# Patient Record
Sex: MALE | NOT HISPANIC OR LATINO | URBAN - METROPOLITAN AREA
[De-identification: names, ages, dates, MRNs, and addresses within clinical notes are randomized per-mention and may not be internally consistent; named-entity substitution may affect disease eponyms.]

---

## 2017-01-27 ENCOUNTER — AMBULATORY - HEALTHEAST (OUTPATIENT)
Dept: PHYSICAL MEDICINE AND REHAB | Facility: CLINIC | Age: 82
End: 2017-01-27

## 2017-02-21 ENCOUNTER — HOSPITAL ENCOUNTER (OUTPATIENT)
Dept: PHYSICAL MEDICINE AND REHAB | Facility: CLINIC | Age: 82
Discharge: HOME | End: 2017-02-21
Attending: PHYSICAL MEDICINE & REHABILITATION

## 2017-02-21 DIAGNOSIS — M54.50 LUMBAR SPINE PAIN: ICD-10-CM

## 2017-02-21 DIAGNOSIS — M53.3 SACROILIAC JOINT PAIN: ICD-10-CM

## 2017-02-21 DIAGNOSIS — M47.812 CERVICAL SPONDYLOSIS: ICD-10-CM

## 2017-02-21 DIAGNOSIS — M48.061 STENOSIS OF LATERAL RECESS OF LUMBAR SPINE: ICD-10-CM

## 2017-02-21 DIAGNOSIS — G62.9 POLYNEUROPATHY: ICD-10-CM

## 2017-02-21 DIAGNOSIS — M47.816 LUMBAR SPONDYLOSIS: ICD-10-CM

## 2017-02-21 RX ORDER — TRIAMTERENE AND HYDROCHLOROTHIAZIDE 37.5; 25 MG/1; MG/1
CAPSULE ORAL
Status: SHIPPED | COMMUNITY
Start: 2017-01-10

## 2017-02-21 ASSESSMENT — MIFFLIN-ST. JEOR: SCORE: 1351.45

## 2018-01-22 ENCOUNTER — HOSPITAL ENCOUNTER (OUTPATIENT)
Dept: PHYSICAL MEDICINE AND REHAB | Facility: CLINIC | Age: 83
Discharge: HOME | End: 2018-01-22
Attending: PHYSICAL MEDICINE & REHABILITATION

## 2018-01-22 DIAGNOSIS — M54.50 LUMBAR SPINE PAIN: ICD-10-CM

## 2018-01-22 DIAGNOSIS — M47.816 LUMBAR SPONDYLOSIS: ICD-10-CM

## 2018-01-22 DIAGNOSIS — G62.9 POLYNEUROPATHY: ICD-10-CM

## 2018-01-22 DIAGNOSIS — M79.2 NEUROPATHIC PAIN, LEG, RIGHT: ICD-10-CM

## 2018-01-22 RX ORDER — GLIPIZIDE 5 MG/1
TABLET ORAL
Status: SHIPPED | COMMUNITY
Start: 2018-01-18

## 2018-01-22 RX ORDER — PANTOPRAZOLE SODIUM 40 MG/1
40 TABLET, DELAYED RELEASE ORAL
Status: SHIPPED | COMMUNITY
Start: 2017-07-25

## 2018-01-22 ASSESSMENT — MIFFLIN-ST. JEOR: SCORE: 1378.66

## 2018-02-12 ENCOUNTER — COMMUNICATION - HEALTHEAST (OUTPATIENT)
Dept: PHYSICAL MEDICINE AND REHAB | Facility: CLINIC | Age: 83
End: 2018-02-12

## 2018-02-12 DIAGNOSIS — M54.50 LUMBAR SPINE PAIN: ICD-10-CM

## 2018-02-12 DIAGNOSIS — M79.2 NEUROPATHIC PAIN, LEG, RIGHT: ICD-10-CM

## 2018-02-12 RX ORDER — PREGABALIN 25 MG/1
25 CAPSULE ORAL 2 TIMES DAILY
Qty: 60 CAPSULE | Refills: 2 | Status: SHIPPED | OUTPATIENT
Start: 2018-02-12

## 2018-08-27 ENCOUNTER — COMMUNICATION - HEALTHEAST (OUTPATIENT)
Dept: PHYSICAL MEDICINE AND REHAB | Facility: CLINIC | Age: 83
End: 2018-08-27

## 2018-09-10 ENCOUNTER — HOSPITAL ENCOUNTER (OUTPATIENT)
Dept: PHYSICAL MEDICINE AND REHAB | Facility: CLINIC | Age: 83
Discharge: HOME | End: 2018-09-10
Attending: PHYSICAL MEDICINE & REHABILITATION

## 2018-09-10 DIAGNOSIS — M54.81 BILATERAL OCCIPITAL NEURALGIA: ICD-10-CM

## 2018-09-10 DIAGNOSIS — M79.2 NEUROPATHIC PAIN, LEG, RIGHT: ICD-10-CM

## 2018-09-10 DIAGNOSIS — G62.9 POLYNEUROPATHY: ICD-10-CM

## 2018-09-10 DIAGNOSIS — M47.812 CERVICAL SPONDYLOSIS: ICD-10-CM

## 2018-09-10 DIAGNOSIS — M54.2 CERVICALGIA: ICD-10-CM

## 2018-09-10 ASSESSMENT — MIFFLIN-ST. JEOR: SCORE: 1374.13

## 2018-09-13 ENCOUNTER — COMMUNICATION - HEALTHEAST (OUTPATIENT)
Dept: PHYSICAL MEDICINE AND REHAB | Facility: CLINIC | Age: 83
End: 2018-09-13

## 2019-03-20 ENCOUNTER — COMMUNICATION - HEALTHEAST (OUTPATIENT)
Dept: PHYSICAL MEDICINE AND REHAB | Facility: CLINIC | Age: 84
End: 2019-03-20

## 2019-03-20 ENCOUNTER — HOSPITAL ENCOUNTER (OUTPATIENT)
Dept: PHYSICAL MEDICINE AND REHAB | Facility: CLINIC | Age: 84
Discharge: HOME | End: 2019-03-20
Attending: PHYSICAL MEDICINE & REHABILITATION

## 2019-03-20 DIAGNOSIS — M79.2 NEUROPATHIC PAIN, LEG, RIGHT: ICD-10-CM

## 2019-03-20 DIAGNOSIS — G47.9 SLEEP DIFFICULTIES: ICD-10-CM

## 2019-03-20 DIAGNOSIS — M54.16 LUMBAR RADICULAR PAIN: ICD-10-CM

## 2019-03-20 DIAGNOSIS — M48.061 STENOSIS OF LATERAL RECESS OF LUMBAR SPINE: ICD-10-CM

## 2019-03-20 RX ORDER — DULOXETIN HYDROCHLORIDE 20 MG/1
20 CAPSULE, DELAYED RELEASE ORAL DAILY
Qty: 30 CAPSULE | Refills: 1 | Status: SHIPPED | OUTPATIENT
Start: 2019-03-20

## 2019-03-20 ASSESSMENT — MIFFLIN-ST. JEOR: SCORE: 1374.13

## 2019-03-28 ENCOUNTER — RECORDS - HEALTHEAST (OUTPATIENT)
Dept: ADMINISTRATIVE | Facility: OTHER | Age: 84
End: 2019-03-28

## 2019-04-12 ENCOUNTER — COMMUNICATION - HEALTHEAST (OUTPATIENT)
Dept: PHYSICAL MEDICINE AND REHAB | Facility: CLINIC | Age: 84
End: 2019-04-12

## 2019-04-12 DIAGNOSIS — M54.16 LUMBAR RADICULAR PAIN: ICD-10-CM

## 2020-03-03 ENCOUNTER — RECORDS - HEALTHEAST (OUTPATIENT)
Dept: ADMINISTRATIVE | Facility: OTHER | Age: 85
End: 2020-03-03

## 2020-03-09 ENCOUNTER — HOSPITAL ENCOUNTER (OUTPATIENT)
Dept: ULTRASOUND IMAGING | Facility: HOSPITAL | Age: 85
Discharge: HOME | End: 2020-03-09
Attending: INTERNAL MEDICINE

## 2020-03-09 DIAGNOSIS — N18.30 CHRONIC KIDNEY DISEASE, STAGE III (MODERATE) (H): ICD-10-CM

## 2021-05-24 ENCOUNTER — RECORDS - HEALTHEAST (OUTPATIENT)
Dept: ADMINISTRATIVE | Facility: CLINIC | Age: 86
End: 2021-05-24

## 2021-05-27 NOTE — TELEPHONE ENCOUNTER
If he still having pain despite physical therapy and has a pain flare despite doing his home exercises, I can place an order for a right S1-S2 transforaminal epidural steroid injection.  We can attempt to get this approved so this can be scheduled during time of a pain flare.    He should be educated that this plan is somewhat atypical for patient's as most of them do not have the severe flares intermittently and during a flare we will do our best to get him in and make this plan work.

## 2021-05-27 NOTE — TELEPHONE ENCOUNTER
Patient called to let Dr. Flowers know that he finished physical therapy.  He said he would like a injection order so that when he gets a flare up he can just schedule and come in to get his injection.  I explained to patient that we have to schedule him out 5-7 days per insurance purpose. Patient said that is not what he was told by Dr. Flowers.  He said his flare ups only last 30-40 hours so 5 days later for an injection isn't going to do him any good. He asked that I discuss this with Dr. Flowers because this is not what he was told.  He would like a call back once this is done.

## 2021-05-27 NOTE — TELEPHONE ENCOUNTER
Called patient.  Per Tia, Medicare does not need PA for injection.  He may call and get schedule possibly same day for his injection.  No need to see .

## 2021-05-29 ENCOUNTER — RECORDS - HEALTHEAST (OUTPATIENT)
Dept: ADMINISTRATIVE | Facility: CLINIC | Age: 86
End: 2021-05-29

## 2021-05-30 ENCOUNTER — RECORDS - HEALTHEAST (OUTPATIENT)
Dept: ADMINISTRATIVE | Facility: CLINIC | Age: 86
End: 2021-05-30

## 2021-05-30 VITALS — BODY MASS INDEX: 25.11 KG/M2 | HEIGHT: 67 IN | WEIGHT: 160 LBS

## 2021-05-31 VITALS — BODY MASS INDEX: 26.06 KG/M2 | HEIGHT: 67 IN | WEIGHT: 166 LBS

## 2021-06-01 VITALS — HEIGHT: 67 IN | WEIGHT: 165 LBS | BODY MASS INDEX: 25.9 KG/M2

## 2021-06-02 VITALS — WEIGHT: 165 LBS | HEIGHT: 67 IN | BODY MASS INDEX: 25.9 KG/M2

## 2021-06-09 NOTE — PROGRESS NOTES
Assessment/Plan:      Diagnoses and all orders for this visit:    Lumbar spine pain    Sacroiliac joint pain    Lumbar spondylosis    Stenosis of lateral recess of lumbar spine    Cervical spondylosis    Polyneuropathy      Assessment: Multiple chronic pain complaints:    1.  Chronic lumbar spine pain at the lumbosacral junction.  Most consistent with SI mediated pain right greater than left versus facet mediated pain    2.  Right leg paresthesias with long bike rides.  He has Multilevel spondylosis with degenerative disc disease.  Status post L3 and L4 laminectomy.  Broad based disc bulge at L5-S1 narrowing the lateral recess on the right likely responsible for his right leg pain.    3. Cervical spondylosis at multiple levels on x-ray with asymmetric mild right greater than left AA joint osteoarthritis.  He also has changes on MRI consistent with spondylosis with multilevel degenerative disc disease in the mid cervical spine with some central stenosis.     4. Headaches/had pain intermittently likely cervicogenic in nature.    5.  Polyneuropathy findings on EMG.  Likely incidental and asymptomatic.      Discussion:    1.  I reviewed all of the issues with him again today.  He was somewhat confused and wanted to go through each issue and have an explanation for what is causing his symptoms.  We discussed this as well as treatment options.    2.  Continue physical therapy for cervical spine and lumbar spine.    3.  Offered SI joint injection but he is not interested in any intervention at this time as he is not as active at this time of year.    4.  In the future we could perform SI joint injection facet injections for the pain in the low back, transforaminal epidural for the right leg pain, and can also offer interventions for the head pain.  He is not interested at this time.    5.  Reassurance provided for the polyneuropathy.    6.  Follow up with me as needed    25 minutes were spent with this patient in addition  to any procedure with greater than 20 minutes in counseling and coordination of care.    It was our pleasure caring for your patient today, if there any questions or concerns please do not hesitate to contact us.      Subjective:   Patient ID: Kiel Gil is a 82 y.o. male.    History of Present Illness: Patient presents for evaluation of multiple pain complaints.  Most significant pain is lumbar spine.  Straight lumbosacral junction to the right greater than left SI region.  Pain is a 3/10 today 10/10 at worst.  No changes last visit.  Worse when he first gets up in the morning better with activity.  No current radiation down the legs numbness tingling weakness.    With episodes of biking he also gets some right leg numbness and tingling down to the foot.  Wondering what is causing this.  Of biking also causes significant suboccipital pain and head pain.  It's long times of sitting flex forward of looking up.  His leg pain that improves with any lumbar extension or flexing his lumbar spine with extending his right leg and hip.    Again, he has been doing physical therapy doing his exercises and is unsure that it is too terribly helpful but wanted to check in today to review each diagnosis again.  He also had questions about his EMG that was done at [facility].    EMG: Do show diffuse low amplitudes of the motor studies an absent sensory studies with some increased insertional activity in the EHL and medial gastroc.  Findings are consistent with the polyneuropathy finding cannot exclude L5 radiculopathy concomitantly.    Imaging: I reviewed MRI images of the lumbar spine.  Multilevel degenerative disc disease.  L5-S1 broad-based right paracentral disc bulge narrowing the right greater than left lateral recess.    Cervical plain films personally reviewed.  Degenerative disc disease throughout the mid cervical spine.  Right greater than left asymmetric degenerative changes of the AA joint.    Cervical  MRI images reviewed.  Central stenosis in the mid cervical spine from multilevel degenerative disc disease no high-grade spinal cord compression.    Review of systems:No numbness, tingling or weakness.  No bowel or bladder incontinence.  No headaches, dizziness, nausea, vomiting, blurred vision or balance deficits.    Past Medical History:   Diagnosis Date     Change in bowel habit      Diabetes mellitus      Gout      Heart disease      Hyperlipidemia      Hypertension      Irregular heart rate        The following portions of the patient's history were reviewed and updated as appropriate: allergies, current medications, past family history, past medical history, past social history, past surgical history and problem list.      Objective:   Physical Exam:    Vitals:    02/21/17 0805   Pulse: 77       General: Alert and oriented with normal affect. Attention, knowledge, memory, and language are intact. No acute distress.   Eyes: Sclerae are clear.  Respirations: Unlabored. CV: No lower extremity edema.   Gait:  Nonantalgic  Sensation is intact to light touch throughout the ower extremities.  Reflexes are   One plus right, 2+ left patellar and 0 Achilles  .    Manual muscle testing reveals:  Right /Left out of 5        5/5 ankle plantar flexors  5/5 ankle dorsiflexors  5/5  EHL

## 2021-06-15 NOTE — PROGRESS NOTES
Assessment/Plan:      Diagnoses and all orders for this visit:    Neuropathic pain, leg, right  -     Discontinue: pregabalin (LYRICA) 25 MG capsule; Take 1 capsule (25 mg total) by mouth 2 (two) times a day.  Dispense: 60 capsule; Refill: 2  -     pregabalin (LYRICA) 25 MG capsule; Take 1 capsule (25 mg total) by mouth 2 (two) times a day.  Dispense: 60 capsule; Refill: 2    Lumbar spine pain  -     Discontinue: pregabalin (LYRICA) 25 MG capsule; Take 1 capsule (25 mg total) by mouth 2 (two) times a day.  Dispense: 60 capsule; Refill: 2  -     pregabalin (LYRICA) 25 MG capsule; Take 1 capsule (25 mg total) by mouth 2 (two) times a day.  Dispense: 60 capsule; Refill: 2    Lumbar spondylosis    Polyneuropathy        Assessment: Very pleasant 83-year-old gentleman with a history of diabetes mellitus, hypertension hyperlipidemia with:    1. Worsening of right lower extremity paresthesias/neuropathic pain.  This starts in the right gluteal region down the back of the leg to the right lateral foot into the bottom of the foot.  Could be lumbar radicular nature or more of a peripheral neuropathic pain issue.  2.  Chronic lumbar spine pain at the lumbosacral junction.  He does have multilevel degenerative disc disease and facet arthropathy.  There is some mild to moderate right and mild left foraminal stenosis related to degenerative changes.  History of lumbar decompression L3-5.    3.  Polyneuropathy findings and old EMG from neurology.  Unknown significance clinically.  As he has no symptoms on his left leg.      Discussion:    1.  He has progressive worsening of the electrical sensations in the right leg which are consistent with neuropathic pain.  He does have some foraminal stenosis at L5-S1, but I am unsure of the correlation as his symptoms are increasing in frequency and intensity and duration.  This may represent a peripheral nerve issue as well.  Less likely vascular given normal pulses and his symptom  description.  We discussed options such as medications along with further imaging or diagnostics.  He is really interested in pain management and potentially solution for his pain if if a problem is found.  2.  Trial Lyrica 25 mg twice daily and can increase this to 50 mg twice daily if needed for his pain.  He is going to take this initially when the pain occurs to see if it is helpful.  He does not want to be on medications for maintenance of possible.  3.  He has some hydrocodone left over at home from a prior prescription.  This is helpful.  If his pain is severe enough he may need to take 1 of these and we can discuss potential pain management down the road but I really would like to find a cause for his pain initially.  4.  Consideration for updated MRI or EMG if this is not helpful.  5.  Follow-up 1 month    25 minutes were spent with this patient in addition to any procedure with greater than 50% in counseling and coordination of care.    It was our pleasure caring for your patient today, if there any questions or concerns please do not hesitate to contact us.      Subjective:   Patient ID: Kiel Gil is a 83 y.o. male.    History of Present Illness:Patient presents for an evaluation of right leg pain and paresthesias.  This pain is worsening over time and becomes quite severe.  Is a long-standing history of back issues dating back to the 1970s where he was hospitalized whenever he stood on the right leg had severe sharp pain down the right leg into the lateral foot and his entire leg became numb for several hours.  He did avoid surgery at that time in the lumbar spine and most of his symptoms have resolved.  He then had lumbar decompression around 2000 by Dr. Garcia at L3-L5.  Despite this every so often he has severe sharp shooting electrical pains from the right-sided low back down the back of the leg to the lateral ankle lateral foot into all the toes.  This used to only occur every several months  may be 2-3 times a year.  This is becoming more frequent now.  These come every several weeks and he will have several hours of these sensations occurring every 20 seconds.  Electrical sensations which are severe pain and he has not had anything to help.  He has tried gabapentin which was not helpful.  Has taken hydrocodone which does ease the pain.  Then the sensations can leave for a couple of weeks at a time.  No associated weakness in the leg no current numbness or tingling.    He has been seen by endocrinology.  He tells me they do not believe this is related to his diabetes..  He carries a diagnosis of polyneuropathy from neurology but does not have any symptoms in the left leg currently.  Has had an EMG over a year ago by [facility] which did show than polyneuropathy and no other lumbar radiculopathy.  Pain is a 4/10 at worst no pain today.  Unsure what aggravates his symptoms        Imaging: MRI report and images were personally reviewed and discussed with the patient.  A plastic model was utilized during the discussion.  MRI of the lumbar spine reveals multilevel degenerative disc disease.  Previous L3-L5 decompression posteriorly.  L5-S1 right greater than left lateral recess stenosis with residual broad-based right paracentral disc bulge mild to moderate right and mild left foraminal stenosis.  L4-5 tiny new left subarticular disc extrusion with effacement of the left L5 nerve.  This MRI was done in November 2016.    Review of Systems: Complains of some blurred vision.  No numbness, tingling or weakness.  No bowel or bladder incontinence.  No headaches, dizziness, nausea, vomiting,   or balance deficits.    Past Medical History:   Diagnosis Date     Change in bowel habit      Diabetes mellitus      Gout      Heart disease      Hyperlipidemia      Hypertension      Irregular heart rate      Social history: From [place] originally.  [facility]    The following portions of  the patient's history were reviewed and updated as appropriate: allergies, current medications, past family history, past medical history, past social history, past surgical history and problem list.      Objective:   Physical Exam:    Vitals:    01/22/18 1349   BP: 168/60       General: Alert and oriented with normal affect. Attention, knowledge, memory, and language are intact. No acute distress.   Eyes: Sclerae are clear.  Respirations: Unlabored. CV: No lower extremity edema.   Gait:  Nonantalgic  No tenderness palpation of the lumbar spine gluteal tissues are SI joint  Sensation is intact to light touch throughout the  lower extremities.  Reflexes are negative Hoffmans. 2+ patellar and 0Achilles with downgoing toes.    Manual muscle testing reveals:  Right /Left out of 5     5/5 hip flexors  5/5 knee flexors  5/5 knee extensors  5/5 ankle plantar flexors  5/5 ankle dorsiflexors  5/5  EHL

## 2021-06-20 ENCOUNTER — HEALTH MAINTENANCE LETTER (OUTPATIENT)
Age: 86
End: 2021-06-20

## 2021-06-20 NOTE — PROGRESS NOTES
Assessment/Plan:      Diagnoses and all orders for this visit:    Cervicalgia  -     CT Cervical Spine Without Contrast; Future; Expected date: 9/10/18  -     CT Cervical Spine Without Contrast; Standing  -     CT Cervical Spine Without Contrast    Bilateral occipital neuralgia  -     CT Cervical Spine Without Contrast; Future; Expected date: 9/10/18  -     CT Cervical Spine Without Contrast; Standing  -     CT Cervical Spine Without Contrast    Cervical spondylosis  -     CT Cervical Spine Without Contrast; Future; Expected date: 9/10/18  -     CT Cervical Spine Without Contrast; Standing  -     CT Cervical Spine Without Contrast    Neuropathic pain, leg, right    Polyneuropathy (H)        Assessment:Very pleasant 83-year-old gentleman with a history of diabetes mellitus, hypertension hyperlipidemia with:     1. Upper cervical spine pain with left suboccipital pain most consistent with occipital neuralgia on the left.  Has had this on the right as well.  He also likely has cervical spondylosis at C1-2 joints which likely affect his condition.      2.  Cervical degenerative disc disease mid cervical spine.  Some central stenosis but no myelopathic findings on exam.    3.  Chronic right lower extremity paresthesias/neuropathic pain.  Starts in the gluteal region on the back of the leg to the right lateral foot.  Most significant over the lateral foot.  Could be peripheral nerve entrapment versus from the lumbar spine.  This has improved following changing his biking form.      Discussion:    1.  I discussed the diagnosis and treatment options.  We discussed the options of further imaging of the cervical spine, medications, interventions.  2.  CT cervical spine to evaluate.  3.  Continue therapy as needed may need more than 1 dose to see if it would be helpful.  4.  Continue to use heat as needed if symptoms return.  5.  We will contact by phone with results of imaging and further recommendations.  At this point could  recommend a greater occipital nerve block if his symptoms return.  He can contact the office as if symptoms worsen and we can try to work him in but I would like him to get the CT scan prior to that he is able.  Also will contact us if his right foot pain returns.    It was our pleasure caring for your patient today, if there any questions or concerns please do not hesitate to contact us.    25 minutes were spent with this patient in addition to any procedure with greater than 50% in counseling and coordination of care.      Subjective:   Patient ID: Kiel Gil is a 84 y.o. male.    History of Present Illness: *Patient presents for evaluation of cervical spine pain and head pain and suboccipital region along with ongoing issues of the right foot mostly lateral foot.  His neck pain is been present for 25 years off and on.  This is in the suboccipital region and can occur bilaterally up into the head towards the superior lateral portion of the occipital region.  This waxes and wanes but did have a flare recently.  No known cause for the flare.  This is on the left side suboccipital region into the back of the head he tried changing positions along with indomethacin and Lyrica none of which helped.  Oxycodone helped some.  Heat helped the most.  He was considering going to the emergency department but tried hot shower and then a heat pack which helped his pain for a couple of hours and gave him a reprieve.  The pain is described as pulses of sharp pain every 15 seconds severe every 2 minutes and he would have lesser pulses every 15 seconds in between.    He also has ongoing issues with his right foot.  This is intermittent as well.  He has pain from the back down the back of the leg but mostly in the right lateral foot and heel/lateral malleolus region.  Shocklike sensation that will last up to 30 hours at a time 15 to 22nd pulses worse with walking.  Better with flexing forward at the waist extending his right  hip and leg.  He did change the way he pedals his bike.  Previously was pushing down on the paddle at the metatarsal arch and now he moves his foot up on the pedal a little bit and this has relieved his symptoms for the past couple of months.      Imaging: MRI lumbar spine cervical spine personally reviewed from November 2016.Report reviewed as well.  Cervical spine shows moderate spondylosis with moderate spinal stenosis C4-5 C5-6 and mild at C6 6-7 with moderate to severe foraminal stenosis at those levels.  Plain films of the cervical spine showed some potential spondylosis of the C1-2 joint on the right more than left on my review.  Lumbar MRI shows dorsal decompression L3-4 L4-5.  L5-S1 mild to moderate right mild left foraminal stenosis with degenerative changes of the disc.  L4-5 mild foraminal stenosis C3-4 mild to moderate left foraminal stenosis.    Review of Systems: No numbness, tingling or weakness.  No bowel or bladder incontinence.  No headaches, dizziness, nausea, vomiting, blurred vision or balance deficits.    Past Medical History:   Diagnosis Date     Change in bowel habit      Diabetes mellitus (H)      Gout      Heart disease      Hyperlipidemia      Hypertension      Irregular heart rate        The following portions of the patient's history were reviewed and updated as appropriate: allergies, current medications, past family history, past medical history, past social history, past surgical history and problem list.      Objective:   Physical Exam:    Vitals:    09/10/18 0756   BP: 155/71   Pulse: 67       General: Alert and oriented with normal affect. Attention, knowledge, memory, and language are intact. No acute distress.   Eyes: Sclerae are clear.  Respirations: Unlabored. CV: No lower extremity edema.   Gait:  Nonantalgic  Minimal tenderness over the suboccipital muscles.   Sensation is intact to light touch throughout the upper   extremities.  Reflexes are 2+ and symmetric in the biceps  triceps and brachioradialis with negative Hoffmans.      Manual muscle testing reveals:  Right /Left out of 5   5/5 elbow flexors  5/5 elbow extensors  5/5 wrist extensors  5/5 interosseus

## 2021-06-25 NOTE — TELEPHONE ENCOUNTER
"Patient calling to speak with provider for some advice as to what he can do for the \"severe, severe pain in the right foot. It is continual shots of pain. I haven't slept in 2 days because of this.\" Chart reviewed. Patient last seen in September for neck pain and January 2018 for the back and foot pain. He is taking the Lyrica without relief. \"I am taking some narcotic pills that someone gave me and I don't want to be.\"   "

## 2021-06-25 NOTE — PROGRESS NOTES
Assessment/Plan:      Diagnoses and all orders for this visit:    Lumbar radicular pain  -     DULoxetine (CYMBALTA) 20 MG capsule  Dispense: 30 capsule; Refill: 1  -     Ambulatory referral to Physical Therapy  -     methylPREDNISolone (MEDROL DOSEPACK) 4 mg tablet  Dispense: 21 tablet; Refill: 0    Stenosis of lateral recess of lumbar spine  -     DULoxetine (CYMBALTA) 20 MG capsule  Dispense: 30 capsule; Refill: 1  -     Ambulatory referral to Physical Therapy  -     methylPREDNISolone (MEDROL DOSEPACK) 4 mg tablet  Dispense: 21 tablet; Refill: 0    Neuropathic pain, leg, right  -     DULoxetine (CYMBALTA) 20 MG capsule  Dispense: 30 capsule; Refill: 1  -     Ambulatory referral to Physical Therapy  -     methylPREDNISolone (MEDROL DOSEPACK) 4 mg tablet  Dispense: 21 tablet; Refill: 0    Sleep difficulties  -     DULoxetine (CYMBALTA) 20 MG capsule  Dispense: 30 capsule; Refill: 1        Assessment: Pleasant 84 y.o. male history of diabetes mellitus, hypertension hyperlipidemia with:        1.  4 days of acute flare of right lateral ankle and foot pain most consistent with neuropathic pain.  He does have a broad-based right paracentral disc bulge L5-S1 with some right lateral recess stenosis in combination with facet arthropathy.  Could represent some intermittent S1 radicular symptoms.    2.  Poor sleep related to pain    Discussion:    1.  We discussed the likely cause of the pain.  This does not appear to start at his ankle a sural neuropathy is on the differential but negative Tinel's today and no pain with palpation of the ankle itself or over the sural nerve.  We discussed options of repeating EMG along with medications therapy potential injections.    2.  Start physical therapy for 4-6 visits for nerve glides and core strengthening.  I would like to see if some of these nerve glide exercises to be helpful in limiting his episodes of pain.    3.  Trial Medrol Dosepak for his acute pain flare.    4.  We will  "also provide Cymbalta 20 mg to be taken at night may start this at onset of attack which may help with some of the neuropathic pain.    5.  Can consider a right S1-S2 transforaminal epidural steroid injection for diagnostic and therapeutic purposes.    6.  Consideration for repeat EMG.  I did review the EMG which is done at [facility] and reported peripheral neuropathy but only the right lower extremity was done.  He is not interested in a repeat EMG at this time.    7.  Follow-up in 4-6 weeks or as needed.      30 minutes were spent with this patient in addition to any procedure with greater than 50% in counseling and coordination of care.    It was our pleasure caring for your patient today, if there any questions or concerns please do not hesitate to contact us.      Subjective:   Patient ID: Kiel Gil is a 84 y.o. male.    History of Present Illness: 3 days of acute flare of right lateral ankle foot pain with lower extremity pain.  This started 3-4 days ago no trauma.  He has had this happen in the past for the past 10 years intermittently but becoming worse in intensity and frequency.  He has pulses of sharp shooting pain over the lateral malleolus starting in the Achilles to the lateral right foot.  He does feel that this is emanating from his low back.  Difficult to know what makes it worse but it is difficult to sleep and worse at night typically.  Is constant when it is occurring .  He gets \"zingers of pain \".  Pain is a 10/10 at worst 7/10 today and at best.  Has taken oxycodone 5-10 mg which may slightly take the edge off.  Has had Lyrica in the past without any benefit.  Has had physical therapy but the last therapy was over 2 years ago.        Imaging: Personally reviewed MRI images lumbar spine along with the report*.  This is from 2016.  This shows multilevel degenerative disc disease.  No significant foraminal stenosis noted.  There is some multilevel degenerative changes L3-4 " previous dorsal decompression with mild to moderate left foraminal stenosis.  L4-5 new tiny disc extrusion to the left.  L5-S1 broad-based disc bulge right greater than left with mild to moderate right mild left foraminal stenosis on my review there is right greater than left lateral recess stenosis.    Review of Systems: No  weakness.  No bowel or bladder incontinence.  No urinary retention.  No fevers, unintentional weight loss, balance changes, headaches, frequent falling, difficulty swallowing, or coordination difficulties.    Past Medical History:   Diagnosis Date     Change in bowel habit      Diabetes mellitus (H)      Gout      Heart disease      Hyperlipidemia      Hypertension      Irregular heart rate        The following portions of the patient's history were reviewed and updated as appropriate: allergies, current medications, past family history, past medical history, past social history, past surgical history and problem list.      Objective:   Physical Exam:    Vitals:    03/20/19 1457   BP: 177/77   Pulse: 90       General: Alert and oriented with normal affect. Attention, knowledge, memory, and language are intact. No acute distress.   Eyes: Sclerae are clear.  Respirations: Unlabored. CV: No lower extremity edema.  distal pulses 2+Posterior tibial arteries.  Gait:  Nonantalgic   Negative Tinel's over the lateral malleolus/sural nerve.  No tenderness over the metatarsal arch and squeeze test no tenderness over the cuboid.  No tenderness over the Achilles.  Sensation is intact to light touch throughout the   lower extremities.  Reflexes are  1+ patellar and 0 Achilles      Manual muscle testing reveals:  Right /Left out of 5     5/5 hip flexors  5/5 knee flexors  5/5 knee extensors  5/5 ankle plantar flexors  5/5 ankle dorsiflexors  5/5  EHL

## 2021-10-11 ENCOUNTER — HEALTH MAINTENANCE LETTER (OUTPATIENT)
Age: 86
End: 2021-10-11

## 2022-07-17 ENCOUNTER — HEALTH MAINTENANCE LETTER (OUTPATIENT)
Age: 87
End: 2022-07-17

## 2022-09-25 ENCOUNTER — HEALTH MAINTENANCE LETTER (OUTPATIENT)
Age: 87
End: 2022-09-25

## 2023-08-05 ENCOUNTER — HEALTH MAINTENANCE LETTER (OUTPATIENT)
Age: 88
End: 2023-08-05